# Patient Record
Sex: FEMALE | Race: WHITE
[De-identification: names, ages, dates, MRNs, and addresses within clinical notes are randomized per-mention and may not be internally consistent; named-entity substitution may affect disease eponyms.]

---

## 2017-09-15 NOTE — NUR
# 22 gauge angiocath placed to right forearm.  Use of asceptic technique.   
Blood return noted.   Flushed with 10 cc of normal saline.  No evidence of 
infiltration noted.  Patient tolerated well.

## 2017-09-15 NOTE — NUR
Patient AAOx4, ambulatory. Per pt, paramedic states patient had main complaint 
of a headache that occurred approximately 1 hour prior to ER visit with pain 
scale 10/10. Patient states having nausea and vomiting at this time, 1 episode 
of vomiting noted while in ER. paramedic states patient had systolic BP of 184 
en route to ER, states patient is non-compliant with blood pressure medication. 
Patient denies any other complaints.

## 2017-09-15 NOTE — NUR
Placed in room 1. Placed on cardiac monitor, blood pressure machine and pulse 
oximeter. To gown for exam. Side rails up.

Report given to Colton MCGREGOR.

## 2017-09-15 NOTE — NUR
Patient stable, sleeping. No signs of distress noted. Vital signs within 
therapeutic range. Will continue to monitor.

## 2017-09-15 NOTE — NUR
Patient given written and verbal discharge instructions and verbalizes 
understanding.  ER MD discussed with patient the results and treatment 
provided. Patient in stable condition. ID arm band removed. IV catheter removed 
intact and dressing applied, no active bleeding.

Patient educated on pain management and to follow up with PMD. Pain Scale 0/10.

Opportunity for questions provided and answered.

## 2020-07-22 ENCOUNTER — HOSPITAL ENCOUNTER (EMERGENCY)
Dept: HOSPITAL 4 - SED | Age: 55
Discharge: HOME | End: 2020-07-22
Payer: MEDICAID

## 2020-07-22 VITALS — SYSTOLIC BLOOD PRESSURE: 154 MMHG

## 2020-07-22 VITALS — BODY MASS INDEX: 36.96 KG/M2 | HEIGHT: 66 IN | WEIGHT: 230 LBS

## 2020-07-22 VITALS — SYSTOLIC BLOOD PRESSURE: 152 MMHG

## 2020-07-22 DIAGNOSIS — Z85.3: ICD-10-CM
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
## 2020-07-22 DIAGNOSIS — Z88.2: ICD-10-CM

## 2020-07-22 DIAGNOSIS — S90.852A: Primary | ICD-10-CM
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
## 2020-07-22 DIAGNOSIS — Z88.6: ICD-10-CM

## 2020-07-22 DIAGNOSIS — J45.909: ICD-10-CM

## 2020-07-22 PROCEDURE — 99284 EMERGENCY DEPT VISIT MOD MDM: CPT

## 2020-07-22 NOTE — NUR
Patient given written and verbal discharge instructions and verbalizes 
understanding.  ER MD discussed with patient the results and treatment 
provided. Patient in stable condition. ID arm band removed. 

Rx of Tramadol   given. Patient educated on pain management and to follow up 
with PMD. Pain Scale 2/10

Opportunity for questions provided and answered. Medication side effect fact 
sheet provided.

## 2020-07-22 NOTE — NUR
Pt brought by self, A&Ox4, pt presents to ER with L foot pain after she stepped 
on a foreign object few days ago, skin pink and warm, cap refill <3.